# Patient Record
Sex: FEMALE | Race: WHITE | NOT HISPANIC OR LATINO | Employment: FULL TIME | URBAN - METROPOLITAN AREA
[De-identification: names, ages, dates, MRNs, and addresses within clinical notes are randomized per-mention and may not be internally consistent; named-entity substitution may affect disease eponyms.]

---

## 2021-08-17 PROCEDURE — U0004 COV-19 TEST NON-CDC HGH THRU: HCPCS | Performed by: NURSE PRACTITIONER

## 2021-11-17 ENCOUNTER — PATIENT ROUNDING (BHMG ONLY) (OUTPATIENT)
Dept: GASTROENTEROLOGY | Facility: CLINIC | Age: 41
End: 2021-11-17

## 2021-11-17 NOTE — PROGRESS NOTES
November 17, 2021    Hello, may I speak with Margarita Castro?    My name is Ruma Bonilla     I am  with [facility]  [address] [address] [phone].    Before we get started may I verify your date of birth? 1980    I am calling to officially welcome you to our practice and ask about your recent visit. Is this a good time to talk? No-mailbox full    Tell me about your visit with us. What things went well?         We're always looking for ways to make our patients' experiences even better. Do you have recommendations on ways we may improve?      Overall were you satisfied with your first visit to our practice?        I appreciate you taking the time to speak with me today. Is there anything else I can do for you?       Thank you, and have a great day.

## 2022-06-19 ENCOUNTER — HOSPITAL ENCOUNTER (EMERGENCY)
Facility: HOSPITAL | Age: 42
Discharge: HOME | End: 2022-06-19
Admitting: EMERGENCY MEDICINE

## 2022-06-19 VITALS
TEMPERATURE: 98.3 F | OXYGEN SATURATION: 100 % | RESPIRATION RATE: 16 BRPM | HEIGHT: 63 IN | BODY MASS INDEX: 40.04 KG/M2 | DIASTOLIC BLOOD PRESSURE: 101 MMHG | HEART RATE: 104 BPM | WEIGHT: 226 LBS | SYSTOLIC BLOOD PRESSURE: 148 MMHG

## 2022-06-19 DIAGNOSIS — L03.031 INFECTED NAILBED OF TOE, RIGHT: Primary | ICD-10-CM

## 2022-06-19 PROCEDURE — 90471 IMMUNIZATION ADMIN: CPT | Performed by: NURSE PRACTITIONER

## 2022-06-19 PROCEDURE — 99282 EMERGENCY DEPT VISIT SF MDM: CPT

## 2022-06-19 PROCEDURE — 90715 TDAP VACCINE 7 YRS/> IM: CPT | Performed by: NURSE PRACTITIONER

## 2022-06-19 PROCEDURE — 25010000002 TETANUS-DIPHTH-ACELL PERTUSSIS 5-2.5-18.5 LF-MCG/0.5 SUSPENSION PREFILLED SYRINGE: Performed by: NURSE PRACTITIONER

## 2022-06-19 RX ORDER — CLINDAMYCIN HYDROCHLORIDE 300 MG/1
300 CAPSULE ORAL 3 TIMES DAILY
Qty: 21 CAPSULE | Refills: 0 | Status: SHIPPED | OUTPATIENT
Start: 2022-06-19 | End: 2022-06-26

## 2022-06-19 RX ADMIN — TETANUS TOXOID, REDUCED DIPHTHERIA TOXOID AND ACELLULAR PERTUSSIS VACCINE, ADSORBED 0.5 ML: 5; 2.5; 8; 8; 2.5 SUSPENSION INTRAMUSCULAR at 15:04

## 2022-06-19 NOTE — ED PROVIDER NOTES
Subjective   Patient is a 42-year-old female that presents ER today with complaint of an infection to her right great toe.  She states that about a month ago she had a pedicure done and that they did a lot of cutting around the edges of her nails.  States that since then she has had a little bit of redness and swelling.  Several days ago she worked 2 back-to-back 16-hour shifts and was on her feet all day and thinks that the shoe aggravated this area.  She now complains of increased swelling and pain to the right great toe.  She presents here today for further evaluation.      History provided by:  Patient   used: No        Review of Systems   Musculoskeletal: Positive for joint swelling.   All other systems reviewed and are negative.      No past medical history on file.    No Known Allergies    Past Surgical History:   Procedure Laterality Date   • ADENOIDECTOMY     •  SECTION     • CHOLECYSTECTOMY         No family history on file.    Social History     Socioeconomic History   • Marital status: Single   Tobacco Use   • Smoking status: Never Smoker   • Smokeless tobacco: Never Used   Vaping Use   • Vaping Use: Never used   Substance and Sexual Activity   • Sexual activity: Defer           Objective   Physical Exam  Vitals and nursing note reviewed.   Constitutional:       Appearance: Normal appearance.   HENT:      Head: Normocephalic and atraumatic.      Mouth/Throat:      Pharynx: Oropharynx is clear.   Eyes:      Conjunctiva/sclera: Conjunctivae normal.   Cardiovascular:      Rate and Rhythm: Normal rate and regular rhythm.   Pulmonary:      Effort: Pulmonary effort is normal.      Breath sounds: Normal breath sounds.   Skin:     General: Skin is warm and dry.      Capillary Refill: Capillary refill takes less than 2 seconds.      Comments: Swelling and erythema noted to right great toe.  No fluctuance noted at nailbed.  There is nothing to I&D at this time.  No streaking or drainage  noted.  Full range of motion.  Positive CMS, neurovascularly intact.   Neurological:      General: No focal deficit present.      Mental Status: She is alert.   Psychiatric:         Mood and Affect: Mood normal.         Procedures           ED Course  ED Course as of 06/19/22 1501   Sun Jun 19, 2022   1500 Patient be discharged home with a prescription for clindamycin.  We will update her tetanus vaccination.  Advised to follow-up podiatry.  Advised to use warm Epsom salt soaks for about 10 minutes 4 times a day.  Advised return to the ER for any new or worsening symptoms. [LF]      ED Course User Index  [LF] Karishma Juarez, APRJAYDA                                         No orders to display     Labs Reviewed - No data to display          MDM  Number of Diagnoses or Management Options  Infected nailbed of toe, right: new and does not require workup  Patient Progress  Patient progress: stable      Final diagnoses:   Infected nailbed of toe, right       ED Disposition  ED Disposition     ED Disposition   Discharge    Condition   Stable    Comment   --             Johnnie Quigley, APRN  [address] [facility] [address]  [phone]    Call in 1 day      Raymundo Farr DPM  [address] [facility]  [address]  [place] [address]  [phone]    Call in 1 day           Medication List      New Prescriptions    clindamycin 300 MG capsule  Commonly known as: CLEOCIN  Take 1 capsule by mouth 3 (Three) Times a Day for 7 days.           Where to Get Your Medications      These medications were sent to [facility]/pharmacy #[id number] - [place], [place] - [address] AT ACROSS FROM [address] - [phone]  - [phone]   [address] [place] [address]    Hours: 24-hours Phone: [phone]   · clindamycin 300 MG capsule          Karishma Juarez, ALTA  06/19/22 1501

## 2022-07-27 ENCOUNTER — OFFICE VISIT (OUTPATIENT)
Age: 42
End: 2022-07-27
Payer: MEDICAID

## 2022-07-27 VITALS
WEIGHT: 221 LBS | SYSTOLIC BLOOD PRESSURE: 110 MMHG | DIASTOLIC BLOOD PRESSURE: 78 MMHG | TEMPERATURE: 97.3 F | OXYGEN SATURATION: 96 % | RESPIRATION RATE: 20 BRPM | HEART RATE: 93 BPM | BODY MASS INDEX: 40.67 KG/M2 | HEIGHT: 62 IN

## 2022-07-27 DIAGNOSIS — R30.0 DYSURIA: Primary | ICD-10-CM

## 2022-07-27 DIAGNOSIS — R31.9 URINARY TRACT INFECTION WITH HEMATURIA, SITE UNSPECIFIED: ICD-10-CM

## 2022-07-27 DIAGNOSIS — N39.0 URINARY TRACT INFECTION WITH HEMATURIA, SITE UNSPECIFIED: ICD-10-CM

## 2022-07-27 PROCEDURE — 99213 OFFICE O/P EST LOW 20 MIN: CPT | Performed by: NURSE PRACTITIONER

## 2022-07-27 RX ORDER — IBUPROFEN 800 MG/1
TABLET ORAL
COMMUNITY
Start: 2022-07-20

## 2022-07-27 RX ORDER — BUPRENORPHINE HYDROCHLORIDE AND NALOXONE HYDROCHLORIDE DIHYDRATE 8; 2 MG/1; MG/1
TABLET SUBLINGUAL
COMMUNITY
Start: 2022-06-30

## 2022-07-27 RX ORDER — CEFDINIR 300 MG/1
300 CAPSULE ORAL 2 TIMES DAILY
Qty: 14 CAPSULE | Refills: 0 | Status: SHIPPED | OUTPATIENT
Start: 2022-07-27 | End: 2022-08-03

## 2022-07-27 ASSESSMENT — VISUAL ACUITY: OU: 1

## 2022-07-27 ASSESSMENT — ENCOUNTER SYMPTOMS
VOMITING: 0
NAUSEA: 0
RESPIRATORY NEGATIVE: 1
DIARRHEA: 0
ABDOMINAL PAIN: 0

## 2022-07-27 NOTE — PROGRESS NOTES
[address]  [address] [address] [place] [address]  Dept: [phone]  Dept Fax: [phone]  Loc: [phone]    Claudetta Canter is a 38 y.o. female who presents today for her medical conditions/complaintsas noted below. Claudetta Canter is c/o of Urinary Tract Infection, Dysuria, and Hematuria        HPI:     Urinary Tract Infection   This is a new problem. The current episode started 1 to 4 weeks ago. The problem occurs every urination. The problem has been gradually worsening. The quality of the pain is described as burning. The pain is at a severity of 4/10. The pain is moderate. There has been no fever. There is No history of pyelonephritis. Associated symptoms include hematuria. Pertinent negatives include no chills, discharge, flank pain, frequency, hesitancy, nausea, urgency or vomiting. She has tried antibiotics (Took Bactrim but did not finish this- approximately 2 weeks ago) for the symptoms. The treatment provided mild relief. She saw her provider for her Suboxone 2-3 weeks and was given Bactrim for a UTI she began to feel better so she stopped this but for the past 3 days has developed worsening dysuria and her urine is dark brownish red with an odor. She is afebrile and denies back pain or chills. She drinks very little fluid through the day and drinks mostly tea. No past medical history on file. Past Surgical History:   Procedure Laterality Date    APPENDECTOMY       SECTION      CHOLECYSTECTOMY         No family history on file. Social History     Tobacco Use    Smoking status: Never    Smokeless tobacco: Never   Substance Use Topics    Alcohol use: Not Currently      Current Outpatient Medications   Medication Sig Dispense Refill    cefdinir (OMNICEF) 300 MG capsule Take 1 capsule by mouth in the morning and 1 capsule before bedtime. Do all this for 7 days.  14 capsule 0    ibuprofen (ADVIL;MOTRIN) 800 MG tablet buprenorphine-naloxone (SUBOXONE) 8-2 MG SUBL SL tablet TAKE 2 AND 3/4 TABLETS BY MOUTH ONCE DAILY       No current facility-administered medications for this visit. No Known Allergies    Health Maintenance   Topic Date Due    Varicella vaccine (1 of 2 - 2-dose childhood series) Never done    Depression Screen  Never done    HIV screen  Never done    Hepatitis C screen  Never done    Cervical cancer screen  Never done    Diabetes screen  Never done    COVID-19 Vaccine (3 - Booster for Pfizer series) 02/21/2022    Lipids  Never done    Flu vaccine (1) 09/01/2022    DTaP/Tdap/Td vaccine (3 - Td or Tdap) 06/19/2032    Hepatitis A vaccine  Aged Out    Hepatitis B vaccine  Aged Out    Hib vaccine  Aged Out    Meningococcal (ACWY) vaccine  Aged Out    Pneumococcal 0-64 years Vaccine  Aged Out       Subjective:     Review of Systems   Constitutional:  Negative for activity change, appetite change, chills and fever. Respiratory: Negative. Cardiovascular: Negative. Gastrointestinal:  Negative for abdominal pain, diarrhea, nausea and vomiting. Genitourinary:  Positive for dysuria and hematuria. Negative for flank pain, frequency, hesitancy, urgency, vaginal discharge and vaginal pain. Urine odor   Musculoskeletal:  Negative for arthralgias and myalgias. Skin:  Negative for rash. Hematological: Negative. Psychiatric/Behavioral: Negative.       :Objective      Physical Exam  Vitals and nursing note reviewed. Constitutional:       General: She is awake. She is not in acute distress. Appearance: Normal appearance. She is well-developed and well-groomed. She is obese. She is not ill-appearing. HENT:      Head: Normocephalic and atraumatic. Right Ear: Hearing normal.      Left Ear: Hearing normal.      Nose: Nose normal.      Mouth/Throat:      Lips: Pink. Mouth: Mucous membranes are moist.   Eyes:      General: Lids are normal. Vision grossly intact.    Neck:      Trachea: Phonation normal.   Cardiovascular:      Rate and Rhythm: Normal rate and regular rhythm. Heart sounds: Normal heart sounds, S1 normal and S2 normal. No murmur heard. No friction rub. No gallop. Pulmonary:      Effort: Pulmonary effort is normal. No respiratory distress. Breath sounds: Normal breath sounds and air entry. No wheezing, rhonchi or rales. Abdominal:      General: Abdomen is protuberant. Bowel sounds are normal.      Palpations: Abdomen is soft. Tenderness: There is no abdominal tenderness. There is no right CVA tenderness or left CVA tenderness. Comments: Urine tea colored with reddish tint, foul odor   Musculoskeletal:         General: No tenderness or deformity. Normal range of motion. Cervical back: Full passive range of motion without pain and neck supple. Skin:     General: Skin is warm and dry. Capillary Refill: Capillary refill takes less than 2 seconds. Neurological:      General: No focal deficit present. Mental Status: She is alert, oriented to person, place, and time and easily aroused. Mental status is at baseline. Psychiatric:         Attention and Perception: Attention normal.         Mood and Affect: Mood normal.         Speech: Speech normal.         Behavior: Behavior normal. Behavior is cooperative. /78   Pulse 93   Temp 97.3 °F (36.3 °C) (Temporal)   Resp 20   Ht 5' 2\" (1.575 m)   Wt 221 lb (100.2 kg)   LMP 07/18/2022 (Exact Date)   SpO2 96%   BMI 40.42 kg/m²     :Assessment       Diagnosis Orders   1. Dysuria  Culture, Urine    cefdinir (OMNICEF) 300 MG capsule      2. Urinary tract infection with hematuria, site unspecified  cefdinir (OMNICEF) 300 MG capsule          :Plan      Orders Placed This Encounter   Procedures    Culture, Urine     Order Specific Question:   Specify (ex-cath, midstream, cysto, etc)? Answer:   n/a     Unable to do urinalysis in office machine keeps kicking the sample out.    I am starting her on antibiotic today and stressed to her she needs to complete her medication and must drink plenty of water. She voices good understanding. If symptoms worsen she is advised to go to ER. No follow-ups on file. Orders Placed This Encounter   Medications    cefdinir (OMNICEF) 300 MG capsule     Sig: Take 1 capsule by mouth in the morning and 1 capsule before bedtime. Do all this for 7 days. Dispense:  14 capsule     Refill:  0        Patient Instructions   Plenty of fluids- try to drink at least 8 glasses of water daily  Urine sent for culture, we will call with results in 2-4 days  Omnicef as directed  Follow up with PCP or return to Urgent Care for worsening or unresolved symptoms such as fever or chills   Patient given educational materials- see patient instructions. Discussed use, benefit, and side effects of prescribedmedications. All patient questions answered. Pt voiced understanding.        Electronically signed by BHAVANI Kirkpatrick CNP on 7/27/2022 at 5:47 PM

## 2022-07-27 NOTE — PATIENT INSTRUCTIONS
Plenty of fluids- try to drink at least 8 glasses of water daily  Urine sent for culture, we will call with results in 2-4 days  Omnicef as directed  Follow up with PCP or return to Urgent Care for worsening or unresolved symptoms such as fever or chills
None available

## 2022-07-29 LAB
ORGANISM: ABNORMAL
URINE CULTURE, ROUTINE: ABNORMAL
URINE CULTURE, ROUTINE: ABNORMAL